# Patient Record
Sex: MALE | Race: BLACK OR AFRICAN AMERICAN | Employment: FULL TIME | URBAN - METROPOLITAN AREA
[De-identification: names, ages, dates, MRNs, and addresses within clinical notes are randomized per-mention and may not be internally consistent; named-entity substitution may affect disease eponyms.]

---

## 2019-01-27 ENCOUNTER — APPOINTMENT (OUTPATIENT)
Dept: GENERAL RADIOLOGY | Age: 34
End: 2019-01-27
Attending: NURSE PRACTITIONER
Payer: SELF-PAY

## 2019-01-27 ENCOUNTER — HOSPITAL ENCOUNTER (EMERGENCY)
Age: 34
Discharge: HOME | End: 2019-01-27
Attending: STUDENT IN AN ORGANIZED HEALTH CARE EDUCATION/TRAINING PROGRAM
Payer: SELF-PAY

## 2019-01-27 VITALS
SYSTOLIC BLOOD PRESSURE: 136 MMHG | WEIGHT: 165 LBS | RESPIRATION RATE: 16 BRPM | BODY MASS INDEX: 26.52 KG/M2 | HEART RATE: 78 BPM | TEMPERATURE: 98.7 F | HEIGHT: 66 IN | OXYGEN SATURATION: 97 % | DIASTOLIC BLOOD PRESSURE: 90 MMHG

## 2019-01-27 DIAGNOSIS — M25.561 ACUTE PAIN OF RIGHT KNEE: Primary | ICD-10-CM

## 2019-01-27 PROCEDURE — 74011250637 HC RX REV CODE- 250/637: Performed by: NURSE PRACTITIONER

## 2019-01-27 PROCEDURE — 99283 EMERGENCY DEPT VISIT LOW MDM: CPT

## 2019-01-27 PROCEDURE — L1830 KO IMMOB CANVAS LONG PRE OTS: HCPCS

## 2019-01-27 PROCEDURE — 73562 X-RAY EXAM OF KNEE 3: CPT

## 2019-01-27 RX ORDER — IBUPROFEN 600 MG/1
600 TABLET ORAL
Status: COMPLETED | OUTPATIENT
Start: 2019-01-27 | End: 2019-01-27

## 2019-01-27 RX ORDER — IBUPROFEN 600 MG/1
600 TABLET ORAL
Qty: 20 TAB | Refills: 0 | Status: SHIPPED | OUTPATIENT
Start: 2019-01-27

## 2019-01-27 RX ADMIN — IBUPROFEN 600 MG: 600 TABLET ORAL at 18:26

## 2019-01-27 NOTE — ED TRIAGE NOTES
Triage note: Pt arrives with c/o right knee pain and swelling since yesterday. Denies injury. Pt states it hurts to bear weight.

## 2019-01-27 NOTE — ED PROVIDER NOTES
Wheeled into the exam room in a wheelchair CC: Right knee pain and swelling after drinking [place] iced tea 2 nights ago This is the second time this has occurred No trauma Denies IVDA Tobacco: 0.5 PPD . Last  Wednesday. Drove 8 hours. Reaches destination daily Jered Damon NP 
01/27/19 
6:16 PM 
 
 
 
The history is provided by the patient. No  was used. Knee Pain This is a recurrent problem. The current episode started 12 to 24 hours ago. The pain is present in the right knee. The quality of the pain is described as pounding and constant. The pain is at a severity of 7/10. The pain is moderate. Associated symptoms include limited range of motion and stiffness. Pertinent negatives include no numbness and no tingling. The symptoms are aggravated by standing and movement. Treatments tried: [employer]. The treatment provided no relief. There has been no history of extremity trauma. Past Medical History:  
Diagnosis Date  Asthma No past surgical history on file. No family history on file. Social History Socioeconomic History  Marital status: SINGLE Spouse name: Not on file  Number of children: Not on file  Years of education: Not on file  Highest education level: Not on file Social Needs  Financial resource strain: Not on file  Food insecurity - worry: Not on file  Food insecurity - inability: Not on file  Transportation needs - medical: Not on file  Transportation needs - non-medical: Not on file Occupational History  Not on file Tobacco Use  Smoking status: Current Every Day Smoker Years: 0.50  Smokeless tobacco: Never Used Substance and Sexual Activity  Alcohol use: No  
 Drug use: No  
 Sexual activity: Not on file Other Topics Concern  Not on file Social History Narrative  Not on file ALLERGIES: Patient has no known allergies. Review of Systems Constitutional: Negative for chills and fever. Gastrointestinal: Negative for nausea and vomiting. Musculoskeletal: Positive for arthralgias, gait problem, joint swelling, myalgias and stiffness. Neurological: Negative for tingling and numbness. Vitals:  
 01/27/19 1645 01/27/19 1819 BP:  136/90 Pulse: 81 78 Resp:  16 Temp:  98.7 °F (37.1 °C) SpO2: 96% 97% Weight:  74.8 kg (165 lb) Height:  5' 6\" (1.676 m) Physical Exam  
Constitutional: He appears well-developed and well-nourished. HENT:  
Head: Atraumatic. Eyes: EOM are normal.  
Neck: No tracheal deviation present. Pulmonary/Chest: Effort normal. No respiratory distress. Musculoskeletal:  
     Right knee: He exhibits decreased range of motion, swelling and bony tenderness. He exhibits no effusion, no ecchymosis, no deformity, no laceration, no erythema, normal alignment, no LCL laxity, normal patellar mobility, normal meniscus and no MCL laxity. Tenderness found. Medial joint line tenderness noted. No MCL, no LCL and no patellar tendon tenderness noted. Legs: 
RIGHT KNEE: TTP to medial side. No pain on Varus or valgus stress Neurological: He is alert. Skin: Skin is warm and dry. Psychiatric: He has a normal mood and affect. His behavior is normal. Judgment and thought content normal.  
Nursing note and vitals reviewed. MDM Procedures Assessment & Plan:  
 
Orders Placed This Encounter  XR KNEE RT 3 V  
 ibuprofen (MOTRIN) tablet 600 mg Discussed with Cynthia Pop MD,ED Provider Debra Wang NP 
01/27/19 
6:23 PM 
 
No acute findings. KI/crutches and ibuprofen for pain. Follow-up with ortho. PCP referral. Discussed return precautions. 7:22 PM 
The patient has been reevaluated. The patient is ready for discharge.  The patient's signs, symptoms, diagnosis, and discharge instructions have been discussed and the patient/ family has conveyed their understanding. The patient is to follow up as recommended or return to the ED should their symptoms worsen. Plan has been discussed and the patient is in agreement. LABORATORY TESTS: 
Labs Reviewed - No data to display IMAGING RESULTS: 
Xr Knee Rt 3 V Result Date: 1/27/2019 EXAM: XR KNEE RT 3 V INDICATION: medial right knee pain with swelling. COMPARISON: None. FINDINGS: Three views of the right knee demonstrate no fracture or other acute osseous or articular abnormality. There is no effusion. IMPRESSION: No acute abnormality. MEDICATIONS GIVEN: 
Medications  
ibuprofen (MOTRIN) tablet 600 mg (600 mg Oral Given 1/27/19 2891) IMPRESSION: 
1. Acute pain of right knee PLAN: 
1. Current Discharge Medication List  
  
START taking these medications Details  
ibuprofen (MOTRIN) 600 mg tablet Take 1 Tab by mouth every six (6) hours as needed for Pain. Qty: 20 Tab, Refills: 0  
  
  
 
2. Follow-up Information Follow up With Specialties Details Why Contact Info [facility] [employer]  Schedule an appointment as soon as possible for a visit knee pain [place] [address] [address] [phone] [facility]  Schedule an appointment as soon as possible for a visit As needed. Primary care provider referral [address] [facility] [id number] [phone] [facility] EMERGENCY DEP Emergency Medicine  As needed, If symptoms worsen [address] [facility] [id number] [phone] 3. Return to ED for new or worsening symptoms Sadi Shin NP

## 2019-01-28 NOTE — DISCHARGE INSTRUCTIONS
Thank you for allowing us to care for you today. Please follow-up with your Primary Care provider in the next 2-3 days if your symptoms do not improve. Plan for home:     Ibuprofen 600 mg every 6-8 hours for pain. Use on a schedule for the next 3 days. Follow-up with Orthopedics. You may pick one you have seen before. There is a recommendation listed below. Knee brace when walking. Use crutches for support. RICE therapy:  \"R\" is for rest. Please rest the injured area  \"I\" is for ice. Please ice the area and 20 minute increments multiple times daily. Make sure you have at least 20 minutes between each ice application. Do not place ice directly on the skin as it can cause frostbite. \"C\" is for compression. You may use a light Ace wrap to compress the area to help the swelling. Make sure you have no numbness and tingling past the Ace wrap. The knee immobilizer will help with this as well. \"E\" is for elevate. Keep the swollen area elevated as much as possible. Elevate above the heart whenever possible. Come back to the ER if you have worsening symptoms, fevers over 100.9, shaking chills, nausea or vomiting. Patient Education        Knee Pain or Injury: Care Instructions  Your Care Instructions    Injuries are a common cause of knee problems. Sudden (acute) injuries may be caused by a direct blow to the knee. They can also be caused by abnormal twisting, bending, or falling on the knee. Pain, bruising, or swelling may be severe, and may start within minutes of the injury. Overuse is another cause of knee pain. Other causes are climbing stairs, kneeling, and other activities that use the knee. Everyday wear and tear, especially as you get older, also can cause knee pain. Rest, along with home treatment, often relieves pain and allows your knee to heal. If you have a serious knee injury, you may need tests and treatment. Follow-up care is a key part of your treatment and safety.  Be sure to make and go to all appointments, and call your doctor if you are having problems. It's also a good idea to know your test results and keep a list of the medicines you take. How can you care for yourself at home? · Be safe with medicines. Read and follow all instructions on the label. ? If the doctor gave you a prescription medicine for pain, take it as prescribed. ? If you are not taking a prescription pain medicine, ask your doctor if you can take an over-the-counter medicine. · Rest and protect your knee. Take a break from any activity that may cause pain. · Put ice or a cold pack on your knee for 10 to 20 minutes at a time. Put a thin cloth between the ice and your skin. · Prop up a sore knee on a pillow when you ice it or anytime you sit or lie down for the next 3 days. Try to keep it above the level of your heart. This will help reduce swelling. · If your knee is not swollen, you can put moist heat, a heating pad, or a warm cloth on your knee. · If your doctor recommends an elastic bandage, sleeve, or other type of support for your knee, wear it as directed. · Follow your doctor's instructions about how much weight you can put on your leg. Use a cane, crutches, or a walker as instructed. · Follow your doctor's instructions about activity during your healing process. If you can do mild exercise, slowly increase your activity. · Reach and stay at a healthy weight. Extra weight can strain the joints, especially the knees and hips, and make the pain worse. Losing even a few pounds may help. When should you call for help? Call 911 anytime you think you may need emergency care. For example, call if:    · You have symptoms of a blood clot in your lung (called a pulmonary embolism). These may include:  ? Sudden chest pain. ? Trouble breathing. ?  Coughing up blood.    Call your doctor now or seek immediate medical care if:    · You have severe or increasing pain.     · Your leg or foot turns cold or changes color.     · You cannot stand or put weight on your knee.     · Your knee looks twisted or bent out of shape.     · You cannot move your knee.     · You have signs of infection, such as:  ? Increased pain, swelling, warmth, or redness. ? Red streaks leading from the knee. ? Pus draining from a place on your knee. ? A fever.     · You have signs of a blood clot in your leg (called a deep vein thrombosis), such as:  ? Pain in your calf, back of the knee, thigh, or groin. ? Redness and swelling in your leg or groin.    Watch closely for changes in your health, and be sure to contact your doctor if:    · You have tingling, weakness, or numbness in your knee.     · You have any new symptoms, such as swelling.     · You have bruises from a knee injury that last longer than 2 weeks.     · You do not get better as expected. Where can you learn more? Go to http://eufemia-aurora.info/. Enter K195 in the search box to learn more about \"Knee Pain or Injury: Care Instructions. \"  Current as of: September 23, 2018  Content Version: 11.9  © 4431-9332 [employer]. Care instructions adapted under license by [employer] (which disclaims liability or warranty for this information). If you have questions about a medical condition or this instruction, always ask your healthcare professional. [address] any warranty or liability for your use of this information.